# Patient Record
Sex: FEMALE | Race: WHITE | NOT HISPANIC OR LATINO | Employment: FULL TIME | URBAN - METROPOLITAN AREA
[De-identification: names, ages, dates, MRNs, and addresses within clinical notes are randomized per-mention and may not be internally consistent; named-entity substitution may affect disease eponyms.]

---

## 2024-05-09 ENCOUNTER — TRANSCRIBE ORDERS (OUTPATIENT)
Dept: ADMINISTRATIVE | Facility: HOSPITAL | Age: 29
End: 2024-05-09

## 2024-05-09 ENCOUNTER — HOSPITAL ENCOUNTER (OUTPATIENT)
Dept: GENERAL RADIOLOGY | Facility: HOSPITAL | Age: 29
Discharge: HOME | End: 2024-05-09
Admitting: NURSE PRACTITIONER
Payer: COMMERCIAL

## 2024-05-09 DIAGNOSIS — J45.30 MILD PERSISTENT ASTHMA, UNCOMPLICATED: Primary | ICD-10-CM

## 2024-05-09 DIAGNOSIS — R91.8 RADIOLOGIC FINDINGS OF LUNG FIELD, ABNORMAL: ICD-10-CM

## 2024-05-09 DIAGNOSIS — J45.30 MILD PERSISTENT ASTHMA, UNCOMPLICATED: ICD-10-CM

## 2024-05-09 PROCEDURE — 71046 X-RAY EXAM CHEST 2 VIEWS: CPT

## 2024-06-03 ENCOUNTER — OFFICE VISIT (OUTPATIENT)
Dept: OBSTETRICS AND GYNECOLOGY | Facility: CLINIC | Age: 29
End: 2024-06-03
Payer: COMMERCIAL

## 2024-06-03 VITALS
HEIGHT: 65 IN | SYSTOLIC BLOOD PRESSURE: 110 MMHG | WEIGHT: 131 LBS | BODY MASS INDEX: 21.83 KG/M2 | DIASTOLIC BLOOD PRESSURE: 68 MMHG

## 2024-06-03 DIAGNOSIS — E28.2 PCOS (POLYCYSTIC OVARIAN SYNDROME): Primary | ICD-10-CM

## 2024-06-03 DIAGNOSIS — L63.0 ALOPECIA (CAPITIS) TOTALIS: ICD-10-CM

## 2024-06-03 DIAGNOSIS — Z30.09 ENCOUNTER FOR OTHER GENERAL COUNSELING OR ADVICE ON CONTRACEPTION: ICD-10-CM

## 2024-06-03 RX ORDER — ALBUTEROL SULFATE 90 UG/1
AEROSOL, METERED RESPIRATORY (INHALATION)
COMMUNITY
Start: 2024-04-26

## 2024-06-03 RX ORDER — BECLOMETHASONE DIPROPIONATE HFA 80 UG/1
AEROSOL, METERED RESPIRATORY (INHALATION)
COMMUNITY
Start: 2024-05-21

## 2024-06-03 RX ORDER — MONTELUKAST SODIUM 10 MG/1
TABLET ORAL
COMMUNITY
Start: 2024-05-21

## 2024-06-04 ENCOUNTER — TELEPHONE (OUTPATIENT)
Dept: OBSTETRICS AND GYNECOLOGY | Facility: CLINIC | Age: 29
End: 2024-06-04
Payer: COMMERCIAL

## 2024-06-04 PROBLEM — E28.2 PCOS (POLYCYSTIC OVARIAN SYNDROME): Status: ACTIVE | Noted: 2024-06-04

## 2024-06-04 PROBLEM — Z30.9 CONTRACEPTIVE MANAGEMENT: Status: ACTIVE | Noted: 2024-06-04

## 2024-06-04 PROBLEM — L63.0 ALOPECIA (CAPITIS) TOTALIS: Status: ACTIVE | Noted: 2024-06-04

## 2024-06-04 NOTE — PROGRESS NOTES
27 yo  here today to establish care.     Menarche 18 yo; cycles irregular; regular on her CHC    Denies Hx of STD's or abnormal pap; thinks last pap was      Adopted; knows limited family hx      PMH: History reviewed. No pertinent past medical history.             PSH:     Past Surgical History:   Procedure Laterality Date    TONSILLECTOMY              POB hx:    PGYN hx:  STD Denies   Pap ( Denies Abnormal, LEEP, [facility])  Contraception CHC ( Nexplanon next visit)    Menarche 20yo         Social hx:   Social History     Socioeconomic History    Marital status: Single   Tobacco Use    Smoking status: Never   Vaping Use    Vaping status: Never Used   Substance and Sexual Activity    Alcohol use: Never    Drug use: Never    Sexual activity: Yes        [  X ] no h/o abuse/DV:               Allergies:       Allergies   Allergen Reactions    Aloe Hives    Omnicef [Cefdinir] GI Intolerance                  Meds:   Current Outpatient Medications:     albuterol sulfate  (90 Base) MCG/ACT inhaler, , Disp: , Rfl:     montelukast (SINGULAIR) 10 MG tablet, , Disp: , Rfl:     Qvar RediHaler 80 MCG/ACT inhaler, , Disp: , Rfl:     Sprintec 28 0.25-35 MG-MCG per tablet, , Disp: , Rfl:      Review of Systems   Irregular cycles     Vitals:    24 1611   BP: 110/68        Physical Exam  Constitutional:       Appearance: Normal appearance.   Cardiovascular:      Rate and Rhythm: Normal rate and regular rhythm.   Pulmonary:      Effort: Pulmonary effort is normal.      Breath sounds: Normal breath sounds.   Abdominal:      General: Abdomen is flat.      Palpations: Abdomen is soft.   Neurological:      General: No focal deficit present.      Mental Status: She is alert and oriented to person, place, and time.   Psychiatric:         Mood and Affect: Mood normal.         Behavior: Behavior normal.      GYN Ultrasound       Uterus - 6.9x 4.9x 3.4 cm   Volume - 60.9 cm^3  EL - 0.5 cm     Ovaries PCOS like in  appearance     Comparative data - No     Ryan Be, DO  6/4/2024  10:07 EDT     Diagnoses and all orders for this visit:    1. PCOS (polycystic ovarian syndrome) (Primary)    2. Encounter for other general counseling or advice on contraception    3. Alopecia (capitis) totalis     Nexplanon for contraception and endometrial protection   Declines Derm consult for alopecia  Ordered nexplanon; place next appt with pap screen     Ryan Be,   06/03/2024    10:09 EDT

## 2024-06-20 ENCOUNTER — OFFICE VISIT (OUTPATIENT)
Dept: OBSTETRICS AND GYNECOLOGY | Facility: CLINIC | Age: 29
End: 2024-06-20
Payer: COMMERCIAL

## 2024-06-20 VITALS
BODY MASS INDEX: 22.82 KG/M2 | SYSTOLIC BLOOD PRESSURE: 122 MMHG | DIASTOLIC BLOOD PRESSURE: 70 MMHG | HEIGHT: 65 IN | WEIGHT: 137 LBS

## 2024-06-20 DIAGNOSIS — Z13.89 SCREENING FOR GENITOURINARY CONDITION: Primary | ICD-10-CM

## 2024-06-20 DIAGNOSIS — Z30.017 NEXPLANON INSERTION: ICD-10-CM

## 2024-06-20 LAB
B-HCG UR QL: NEGATIVE
BILIRUB BLD-MCNC: NEGATIVE MG/DL
CLARITY, POC: CLEAR
COLOR UR: YELLOW
EXPIRATION DATE: NORMAL
GLUCOSE UR STRIP-MCNC: NEGATIVE MG/DL
INTERNAL NEGATIVE CONTROL: NORMAL
INTERNAL POSITIVE CONTROL: NORMAL
KETONES UR QL: NEGATIVE
LEUKOCYTE EST, POC: NEGATIVE
Lab: NORMAL
NITRITE UR-MCNC: NEGATIVE MG/ML
PH UR: 5 [PH] (ref 5–8)
PROT UR STRIP-MCNC: NEGATIVE MG/DL
RBC # UR STRIP: NEGATIVE /UL
SP GR UR: 1 (ref 1–1.03)
UROBILINOGEN UR QL: NORMAL

## 2024-06-20 NOTE — PROGRESS NOTES
Subdermal Contraceptive Implant Insertion Note    Aris Al desires a subdermal etonogestrel contraceptive implant insertion.  She has been counseled regarding the risks, benefits and alternatives to the implant.  She especially understands that her menstrual periods are expected to become irregular and unpredictable throughout the time she is using the implant.  She has no contraindications to the insertion.  Her questions have been answered.  She has fully reviewed the FDA-approved consent brochure, has signed the consent form, and wishes to proceed with the insertion today.     Current method of contraception:  abstinence    Patient's last menstrual period was 05/27/2024.    Urine pregnancy test: Neg     Procedure Time Out Documentation       Procedure Details  The inner side of the right arm was cleaned with Betadinex3 and infiltrated with None, 1% lidocaine.  The contraceptive ginna was inserted according to the 's instructions without complications.  The ginna was palpable under the skin after the insertion.  The insertion site was closed with Steri-strip and a pressure dressing was applied.    Lot:  [id number]  Exp:  Apr 2026     Aris was given post-insertion instructions.  She understands that the implant must be removed at the end of three years and may be removed sooner if she wishes.    Successful insertion of nexplanon device.    Patient tolerated the procedure well without complications.

## 2024-10-08 ENCOUNTER — OFFICE VISIT (OUTPATIENT)
Dept: OBSTETRICS AND GYNECOLOGY | Facility: CLINIC | Age: 29
End: 2024-10-08
Payer: COMMERCIAL

## 2024-10-08 VITALS
HEIGHT: 65 IN | DIASTOLIC BLOOD PRESSURE: 62 MMHG | WEIGHT: 134 LBS | SYSTOLIC BLOOD PRESSURE: 110 MMHG | BODY MASS INDEX: 22.33 KG/M2

## 2024-10-08 DIAGNOSIS — Z01.419 ROUTINE GYNECOLOGICAL EXAMINATION: ICD-10-CM

## 2024-10-08 DIAGNOSIS — Z01.419 CERVICAL SMEAR, AS PART OF ROUTINE GYNECOLOGICAL EXAMINATION: Primary | ICD-10-CM

## 2024-10-08 DIAGNOSIS — Z11.51 SPECIAL SCREENING EXAMINATION FOR HUMAN PAPILLOMAVIRUS (HPV): ICD-10-CM

## 2024-10-08 RX ORDER — NORGESTIMATE AND ETHINYL ESTRADIOL 0.25-0.035
KIT ORAL
COMMUNITY
Start: 2024-08-06 | End: 2024-10-08

## 2024-10-08 NOTE — PROGRESS NOTES
"GYN Annual Exam     CC- Here for annual exam.     Aris lA is a 28 y.o. female established patient who presents for annual well woman exam. Periods are regular every 28-30 days, lasting several days.     Initially with nexplanon HVB; now regular. Engaged. May desires children.     OB History          1    Para        Term                AB   1    Living   0         SAB   1    IAB        Ectopic        Molar        Multiple        Live Births   0                Menarche: 20 yo   Current contraception: Nexplanon  History of abnormal Pap smear: no  History of abnormal mammogram: no  Family history of uterine, colon or ovarian cancer: no  Family history of breast cancer: no  H/o STDs: Denies  Last pap:Pap today   Gardasil: 2/3 documented   JERRY: none    Health Maintenance   Topic Date Due    Annual Gynecologic Pelvic and Breast Exam  Never done    Pneumococcal Vaccine 0-64 (1 of 2 - PCV) Never done    HEPATITIS C SCREENING  Never done    ANNUAL PHYSICAL  Never done    PAP SMEAR  Never done    COVID-19 Vaccine ( - - season) 2024    TDAP/TD VACCINES (3 - Td or Tdap) 2027    INFLUENZA VACCINE  Completed       No past medical history on file.    Past Surgical History:   Procedure Laterality Date    TONSILLECTOMY           Current Outpatient Medications:     albuterol sulfate  (90 Base) MCG/ACT inhaler, , Disp: , Rfl:     montelukast (SINGULAIR) 10 MG tablet, , Disp: , Rfl:     Qvar RediHaler 80 MCG/ACT inhaler, , Disp: , Rfl:     Allergies   Allergen Reactions    Aloe Hives    Omnicef [Cefdinir] GI Intolerance       Social History     Tobacco Use    Smoking status: Never    Smokeless tobacco: Never   Vaping Use    Vaping status: Never Used   Substance Use Topics    Alcohol use: Never    Drug use: Never       No family history on file.    Review of Systems  Neg today     /62   Ht 165.1 cm (65\")   Wt 60.8 kg (134 lb)   Breastfeeding No   BMI 22.30 kg/m²     Physical " Exam  Exam conducted with a chaperone present.   Constitutional:       Appearance: Normal appearance.   Pulmonary:      Effort: Pulmonary effort is normal.      Breath sounds: Normal breath sounds.   Abdominal:      General: Abdomen is flat.      Palpations: Abdomen is soft.   Genitourinary:     General: Normal vulva.      Exam position: Lithotomy position.      Vagina: Normal.      Cervix: Normal.      Uterus: Normal.       Adnexa: Right adnexa normal and left adnexa normal.   Skin:     General: Skin is warm and dry.   Neurological:      General: No focal deficit present.      Mental Status: She is alert and oriented to person, place, and time.   Psychiatric:         Mood and Affect: Mood normal.         Behavior: Behavior normal.              Assessment/Plan    1) GYN HM: pap/G/C/T  SBE demonstrated and encouraged.  2) STD screening: declines Condoms encouraged.  3) Contraception: Nexplanon  4) Family Planning: family planning: no plans at present, encourage folic acid daily  5) Diet and Exercise discussed  6) Smoking Status: No  7) Social: Recently engaged!   8) MMG- plan age 41  9)Follow up prn or 1 year       Diagnoses and all orders for this visit:    1. Cervical smear, as part of routine gynecological examination (Primary)  -     POC Urinalysis Dipstick  -     POC Pregnancy, Urine  -     Cancel: Pap IG, Rfx HPV ASCU  -     Hepatitis B Surface Antigen  -     Hepatitis C Antibody  -     HIV-1 / O / 2 Ag / Antibody  -     HSV 1 & 2 - Specific Antibody, IgG  -     RPR, Rfx Qn RPR / Confirm TP  -     IGP,CtNgTv,rfx Aptima HPV ASCU    2. Routine gynecological examination  -     POC Urinalysis Dipstick  -     POC Pregnancy, Urine  -     Cancel: Pap IG, Rfx HPV ASCU  -     Hepatitis B Surface Antigen  -     Hepatitis C Antibody  -     HIV-1 / O / 2 Ag / Antibody  -     HSV 1 & 2 - Specific Antibody, IgG  -     RPR, Rfx Qn RPR / Confirm TP  -     IGP,CtNgTv,rfx Aptima HPV ASCU    3. Special screening examination for  human papillomavirus (HPV)  -     POC Urinalysis Dipstick  -     POC Pregnancy, Urine  -     Cancel: Pap IG, Rfx HPV ASCU  -     Hepatitis B Surface Antigen  -     Hepatitis C Antibody  -     HIV-1 / O / 2 Ag / Antibody  -     HSV 1 & 2 - Specific Antibody, IgG  -     RPR, Rfx Qn RPR / Confirm TP  -     IGP,CtNgTv,rfx Aptima HPV ASCU          Ryan Be DO  10/08/2024    15:05 EDT

## 2024-10-09 LAB
HBV SURFACE AG SERPL QL IA: NEGATIVE
HCV IGG SERPL QL IA: NON REACTIVE
HIV 1+2 AB+HIV1 P24 AG SERPL QL IA: NON REACTIVE
HSV1 IGG SER IA-ACNC: REACTIVE
HSV2 IGG SER IA-ACNC: NON REACTIVE
RPR SER QL: NON REACTIVE

## 2024-10-16 LAB
C TRACH RRNA CVX QL NAA+PROBE: NEGATIVE
CONV .: ABNORMAL
CYTOLOGIST CVX/VAG CYTO: ABNORMAL
CYTOLOGY CVX/VAG DOC CYTO: ABNORMAL
CYTOLOGY CVX/VAG DOC THIN PREP: ABNORMAL
DX ICD CODE: ABNORMAL
DX ICD CODE: ABNORMAL
HPV I/H RISK 4 DNA CVX QL PROBE+SIG AMP: POSITIVE
Lab: ABNORMAL
N GONORRHOEA RRNA CVX QL NAA+PROBE: NEGATIVE
OTHER STN SPEC: ABNORMAL
PATHOLOGIST CVX/VAG CYTO: ABNORMAL
STAT OF ADQ CVX/VAG CYTO-IMP: ABNORMAL
T VAGINALIS RRNA SPEC QL NAA+PROBE: NEGATIVE

## 2024-10-28 ENCOUNTER — OFFICE VISIT (OUTPATIENT)
Dept: OBSTETRICS AND GYNECOLOGY | Facility: CLINIC | Age: 29
End: 2024-10-28
Payer: COMMERCIAL

## 2024-10-28 VITALS
BODY MASS INDEX: 22.82 KG/M2 | SYSTOLIC BLOOD PRESSURE: 114 MMHG | DIASTOLIC BLOOD PRESSURE: 76 MMHG | WEIGHT: 137 LBS | HEIGHT: 65 IN

## 2024-10-28 DIAGNOSIS — R87.810 ASCUS WITH POSITIVE HIGH RISK HPV CERVICAL: ICD-10-CM

## 2024-10-28 DIAGNOSIS — R87.610 ASCUS WITH POSITIVE HIGH RISK HPV CERVICAL: ICD-10-CM

## 2024-10-28 DIAGNOSIS — Z13.89 SCREENING FOR GENITOURINARY CONDITION: Primary | ICD-10-CM

## 2024-10-28 LAB
B-HCG UR QL: NEGATIVE
BILIRUB BLD-MCNC: NEGATIVE MG/DL
CLARITY, POC: CLEAR
COLOR UR: ABNORMAL
EXPIRATION DATE: NORMAL
GLUCOSE UR STRIP-MCNC: NEGATIVE MG/DL
INTERNAL NEGATIVE CONTROL: NORMAL
INTERNAL POSITIVE CONTROL: NORMAL
KETONES UR QL: NEGATIVE
LEUKOCYTE EST, POC: NEGATIVE
Lab: NORMAL
NITRITE UR-MCNC: NEGATIVE MG/ML
PH UR: 8 [PH] (ref 5–8)
PROT UR STRIP-MCNC: NEGATIVE MG/DL
RBC # UR STRIP: ABNORMAL /UL
SP GR UR: 1.01 (ref 1–1.03)
UROBILINOGEN UR QL: NORMAL

## 2024-10-28 RX ORDER — NORGESTIMATE AND ETHINYL ESTRADIOL 0.25-0.035
KIT ORAL
COMMUNITY
Start: 2024-10-24

## 2024-10-28 NOTE — PROGRESS NOTES
Here today for AUB ; HBC clots on her cycle right now. Had her US. Using Nexplanon     Also here today for Colpo; ASCUS on Pap; HPV + .  Needs Gardasil #3. Monogamy   Non smoker       Last Appt:   Aris Al is a 28 y.o. female established patient who presents for annual well woman exam. Periods are regular every 28-30 days, lasting several days.     Initially with nexplanon HVB; now regular. Engaged. May desires children.     OB History          1    Para        Term                AB   1    Living   0         SAB   1    IAB        Ectopic        Molar        Multiple        Live Births   0                Menarche: 18 yo   Current contraception: Nexplanon  History of abnormal Pap smear: no  History of abnormal mammogram: no  Family history of uterine, colon or ovarian cancer: no  Family history of breast cancer: no  H/o STDs: Denies  Last pap:Pap today   Gardasil: 2/3 documented   JERRY: none    Health Maintenance   Topic Date Due    Annual Gynecologic Pelvic and Breast Exam  Never done    Pneumococcal Vaccine 0-64 (1 of 2 - PCV) Never done    ANNUAL PHYSICAL  Never done    COVID-19 Vaccine (2023- season) 2024    TDAP/TD VACCINES (3 - Td or Tdap) 2027    PAP SMEAR  10/08/2027    HEPATITIS C SCREENING  Completed    INFLUENZA VACCINE  Completed       No past medical history on file.    Past Surgical History:   Procedure Laterality Date    TONSILLECTOMY           Current Outpatient Medications:     Sprintec 28 0.25-35 MG-MCG per tablet, , Disp: , Rfl:     albuterol sulfate  (90 Base) MCG/ACT inhaler, , Disp: , Rfl:     montelukast (SINGULAIR) 10 MG tablet, , Disp: , Rfl:     Qvar RediHaler 80 MCG/ACT inhaler, , Disp: , Rfl:     Allergies   Allergen Reactions    Aloe Hives    Omnicef [Cefdinir] GI Intolerance       Social History     Tobacco Use    Smoking status: Never    Smokeless tobacco: Never   Vaping Use    Vaping status: Never Used   Substance Use Topics    Alcohol use:  "Never    Drug use: Never       No family history on file.    Review of Systems  Neg today     /76   Ht 165.1 cm (65\")   Wt 62.1 kg (137 lb)   BMI 22.80 kg/m²     Physical Exam  Exam conducted with a chaperone present.   Constitutional:       Appearance: Normal appearance.   Pulmonary:      Effort: Pulmonary effort is normal.      Breath sounds: Normal breath sounds.   Abdominal:      General: Abdomen is flat.      Palpations: Abdomen is soft.   Genitourinary:     General: Normal vulva.      Exam position: Lithotomy position.      Vagina: Normal.      Cervix: Normal.      Uterus: Normal.       Adnexa: Right adnexa normal and left adnexa normal.   Skin:     General: Skin is warm and dry.   Neurological:      General: No focal deficit present.      Mental Status: She is alert and oriented to person, place, and time.   Psychiatric:         Mood and Affect: Mood normal.         Behavior: Behavior normal.     GYN Ultrasound         Uterus - 7 x 3.9 x 3.5 cm   Volume - 51.4 cm^3  EL - 0.17 cm      Ovaries Bilateral multiple follicles   L cyst 1.99 x 2.1 cm      Comparative data - Yes     Ryan Be DO  10/28/2024  14:20 EDT        The risks, benefits, and alternatives were reviewed with the patient and written consent was obtained. A timeout was performed using two patient identifiers.  A speculum was placed in the vagina.  Vinegar/Lugols was applied to the cervix. Colposcopy performed and was adequate.  Lugols + was noted at 9' .   A biopsy of the cervix was taken at 9'.   An endocervical curettage was performed.  Biopsy sites were hemostatic with pressure Monsels.   The speculum was removed from the vagina. The specimens were sent to pathology. The patient tolerated the procedure well.        Assessment/Plan        Diagnoses and all orders for this visit:    1. Screening for genitourinary condition (Primary)  -     POC Urinalysis Dipstick  -     POC Pregnancy, Urine    2. ASCUS with positive high risk HPV " cervical  -     Reference Histopathology    F/U path per [facility]P guidelines; ROSEY 1 anticipated   Gardasil #3 next appt   Discussed AUB; probable progesterone effect on Nexplanon       Ryan Be DO  10/28/2024    14:39 EDT

## 2024-10-30 LAB
DX ICD CODE: NORMAL
PATH REPORT.FINAL DX SPEC: NORMAL
PATH REPORT.GROSS SPEC: NORMAL
PATH REPORT.RELEVANT HX SPEC: NORMAL
PATH REPORT.SITE OF ORIGIN SPEC: NORMAL
PATHOLOGIST NAME: NORMAL
PAYMENT PROCEDURE: NORMAL

## 2024-11-07 ENCOUNTER — OFFICE VISIT (OUTPATIENT)
Dept: OBSTETRICS AND GYNECOLOGY | Facility: CLINIC | Age: 29
End: 2024-11-07
Payer: COMMERCIAL

## 2024-11-07 VITALS
BODY MASS INDEX: 22.66 KG/M2 | HEIGHT: 65 IN | DIASTOLIC BLOOD PRESSURE: 60 MMHG | SYSTOLIC BLOOD PRESSURE: 106 MMHG | WEIGHT: 136 LBS

## 2024-11-07 DIAGNOSIS — N73.0 ACUTE PID (PELVIC INFLAMMATORY DISEASE): Primary | ICD-10-CM

## 2024-11-07 DIAGNOSIS — Z13.89 SCREENING FOR GENITOURINARY CONDITION: ICD-10-CM

## 2024-11-07 RX ORDER — CEFTRIAXONE 1 G/1
500 INJECTION, POWDER, FOR SOLUTION INTRAMUSCULAR; INTRAVENOUS ONCE
Status: COMPLETED | OUTPATIENT
Start: 2024-11-07 | End: 2024-11-07

## 2024-11-07 RX ORDER — METRONIDAZOLE 500 MG/1
500 TABLET ORAL 2 TIMES DAILY
Qty: 14 TABLET | Refills: 0 | Status: SHIPPED | OUTPATIENT
Start: 2024-11-07 | End: 2024-11-21

## 2024-11-07 RX ORDER — DOXYCYCLINE HYCLATE 100 MG
100 TABLET ORAL 2 TIMES DAILY
Qty: 28 TABLET | Refills: 0 | Status: SHIPPED | OUTPATIENT
Start: 2024-11-07 | End: 2024-11-21

## 2024-11-07 RX ADMIN — CEFTRIAXONE 500 MG: 1 INJECTION, POWDER, FOR SOLUTION INTRAMUSCULAR; INTRAVENOUS at 16:42

## 2024-11-07 NOTE — PROGRESS NOTES
After her Colpo has pelvic pain and concern for infection. Bleeding finally stopped       Last Appt:   Aris Al is a 28 y.o. female established patient who presents for annual well woman exam. Periods are regular every 28-30 days, lasting several days.     Initially with nexplanon HVB; now regular. Engaged. May desires children.     OB History          1    Para        Term                AB   1    Living   0         SAB   1    IAB        Ectopic        Molar        Multiple        Live Births   0                Menarche: 18 yo   Current contraception: Nexplanon  History of abnormal Pap smear: no  History of abnormal mammogram: no  Family history of uterine, colon or ovarian cancer: no  Family history of breast cancer: no  H/o STDs: Denies  Last pap:Pap today     Colpo ROSEY 1 noted on Bx ( Oct 2024) . Repeat Pap screen in 1 year with me   Gardasil: 2/3 documented   JERRY: none    Health Maintenance   Topic Date Due    Annual Gynecologic Pelvic and Breast Exam  Never done    Pneumococcal Vaccine 0-64 (1 of 2 - PCV) Never done    ANNUAL PHYSICAL  Never done    COVID-19 Vaccine ( - - season) 2024    TDAP/TD VACCINES (3 - Td or Tdap) 2027    PAP SMEAR  10/08/2027    HEPATITIS C SCREENING  Completed    INFLUENZA VACCINE  Completed       No past medical history on file.    Past Surgical History:   Procedure Laterality Date    TONSILLECTOMY           Current Outpatient Medications:     albuterol sulfate  (90 Base) MCG/ACT inhaler, , Disp: , Rfl:     montelukast (SINGULAIR) 10 MG tablet, , Disp: , Rfl:     Qvar RediHaler 80 MCG/ACT inhaler, , Disp: , Rfl:     doxycycline (VIBRAMYICN) 100 MG tablet, Take 1 tablet by mouth 2 (Two) Times a Day for 14 days., Disp: 28 tablet, Rfl: 0    metroNIDAZOLE (FLAGYL) 500 MG tablet, Take 1 tablet by mouth 2 (Two) Times a Day for 14 days., Disp: 14 tablet, Rfl: 0  No current facility-administered medications for this visit.    Allergies   Allergen  "Reactions    Aloe Hives    Omnicef [Cefdinir] GI Intolerance       Social History     Tobacco Use    Smoking status: Never    Smokeless tobacco: Never   Vaping Use    Vaping status: Never Used   Substance Use Topics    Alcohol use: Never    Drug use: Never       No family history on file.    Review of Systems  Neg today     /60   Ht 165.1 cm (65\")   Wt 61.7 kg (136 lb)   LMP  (LMP Unknown)   Breastfeeding No   BMI 22.63 kg/m²     Physical Exam  Exam conducted with a chaperone present.   Constitutional:       Appearance: Normal appearance.   Pulmonary:      Effort: Pulmonary effort is normal.      Breath sounds: Normal breath sounds.   Abdominal:      General: Abdomen is flat.      Palpations: Abdomen is soft.   Genitourinary:     General: Normal vulva.      Exam position: Lithotomy position.      Vagina: Normal.      Cervix: Normal. Cervical motion tenderness and cervical bleeding present.      Uterus: Normal.       Adnexa: Right adnexa normal and left adnexa normal.   Skin:     General: Skin is warm and dry.   Neurological:      General: No focal deficit present.      Mental Status: She is alert and oriented to person, place, and time.   Psychiatric:         Mood and Affect: Mood normal.         Behavior: Behavior normal.          Assessment/Plan        Diagnoses and all orders for this visit:    1. Acute PID (pelvic inflammatory disease) (Primary)  -     cefTRIAXone (ROCEPHIN) injection 500 mg    2. Screening for genitourinary condition  -     Cancel: POC Urinalysis Dipstick  -     Cancel: POC Pregnancy, Urine    Other orders  -     doxycycline (VIBRAMYICN) 100 MG tablet; Take 1 tablet by mouth 2 (Two) Times a Day for 14 days.  Dispense: 28 tablet; Refill: 0  -     metroNIDAZOLE (FLAGYL) 500 MG tablet; Take 1 tablet by mouth 2 (Two) Times a Day for 14 days.  Dispense: 14 tablet; Refill: 0    F/U path per [facility]P guidelines; ROSEY 1 noted on Bx. Repeat Pap screen in 1 year with me   Gardasil #3 next appt "   Cocnern for PID; Will Tx with Abx   ED precautions given       Ryan Be DO  7Nov24    17:54 EST